# Patient Record
Sex: FEMALE | Race: BLACK OR AFRICAN AMERICAN | URBAN - METROPOLITAN AREA
[De-identification: names, ages, dates, MRNs, and addresses within clinical notes are randomized per-mention and may not be internally consistent; named-entity substitution may affect disease eponyms.]

---

## 2024-05-17 ENCOUNTER — ROUTINE PRENATAL (OUTPATIENT)
Age: 24
End: 2024-05-17

## 2024-05-17 ENCOUNTER — TELEPHONE (OUTPATIENT)
Age: 24
End: 2024-05-17

## 2024-05-17 VITALS
HEIGHT: 64 IN | SYSTOLIC BLOOD PRESSURE: 106 MMHG | BODY MASS INDEX: 33.19 KG/M2 | DIASTOLIC BLOOD PRESSURE: 72 MMHG | WEIGHT: 194.4 LBS | HEART RATE: 112 BPM

## 2024-05-17 DIAGNOSIS — O24.419 GDM (GESTATIONAL DIABETES MELLITUS): ICD-10-CM

## 2024-05-17 DIAGNOSIS — E66.9 OBESITY (BMI 30.0-34.9): ICD-10-CM

## 2024-05-17 DIAGNOSIS — O24.419 GESTATIONAL DIABETES MELLITUS (GDM) IN THIRD TRIMESTER, GESTATIONAL DIABETES METHOD OF CONTROL UNSPECIFIED: Primary | ICD-10-CM

## 2024-05-17 DIAGNOSIS — K21.9 GASTROESOPHAGEAL REFLUX DISEASE WITHOUT ESOPHAGITIS: Primary | ICD-10-CM

## 2024-05-17 RX ORDER — LANCETS 30 GAUGE
EACH MISCELLANEOUS
Qty: 200 EACH | Refills: 3 | Status: SHIPPED | OUTPATIENT
Start: 2024-05-17

## 2024-05-17 RX ORDER — GLUCOSAMINE HCL/CHONDROITIN SU 500-400 MG
CAPSULE ORAL
Qty: 200 STRIP | Refills: 3 | Status: SHIPPED | OUTPATIENT
Start: 2024-05-17

## 2024-05-17 RX ORDER — FAMOTIDINE 10 MG
10 TABLET ORAL 2 TIMES DAILY
COMMUNITY

## 2024-05-17 RX ORDER — BLOOD-GLUCOSE METER
EACH MISCELLANEOUS
Qty: 1 KIT | Refills: 0 | Status: SHIPPED | OUTPATIENT
Start: 2024-05-17

## 2024-05-17 RX ORDER — PEN NEEDLE, DIABETIC 31 GX5/16"
NEEDLE, DISPOSABLE MISCELLANEOUS
Qty: 200 EACH | Refills: 3 | Status: SHIPPED | OUTPATIENT
Start: 2024-05-17

## 2024-05-17 RX ORDER — PANTOPRAZOLE SODIUM 40 MG/1
40 TABLET, DELAYED RELEASE ORAL
Qty: 30 TABLET | Refills: 6 | Status: SHIPPED | OUTPATIENT
Start: 2024-05-17

## 2024-05-17 NOTE — PROCEDURES
PATIENT: HEATH RUCKER   -  : 2000   -  DOS:2024   -  INTERPRETING PROVIDER:Benoit Ramirez,   Indication  ========    Gestational diabetes    Method  ======    Transabdominal ultrasound examination. View: Suboptimal view: limited by late gestational age    Dating  ======    LMP on: 10/6/2023  GA by LMP 32 w + 0 d  JOY by LMP: 2024  Previous Ultrasound on: 2023  Type of prior assessment: GA  GA at prior assessment date 8 w + 1 d  GA by previous U/S 31 w + 5 d  JOY by previous Ultrasound: 2024  Ultrasound examination on: 2024  GA by U/S based upon: AC, BPD, Femur, HC  GA by U/S 31 w + 4 d  JOY by U/S: 7/15/2024  Assigned: based on the LMP, selected on 2024  Assigned GA 32 w + 0 d  Assigned JOY: 2024    Fetal Growth Overview  =================    Exam date        GA              BPD (mm)          HC (mm)             AC (mm)              FL (mm)             HL (mm)             EFW (g)  2024        32w 0d        78.1     22%        283.9    4%        280.9     53%        61.5    34%        52.8     18%        1860    36%    Fetal Biometry  ============    Standard  BPD 78.1 mm 31w 2d 22% Hadlock  OFD 99.2 mm 32w 0d 52% Robe  .9 mm 31w 1d 4% Hadlock  Cerebellum tr 42.5 mm 33w 3d 73% Hill  .9 mm 32w 1d 53% Hadlock  Femur 61.5 mm 31w 6d 34% Hadlock  Humerus 52.8 mm 30w 5d 18% Robe  EFW 1,860 g 31w 4d 36% Hadlock  EFW (lb) 4 lb  EFW (oz) 2 oz  EFW by: Hadlock (BPD-HC-AC-FL)  Extended   2.6 mm  Nasal bone 11.2 mm  Head / Face / Neck  Nasal bone: present  Other Structures   bpm    General Evaluation  ==============    Cardiac activity present.  bpm. Fetal movements: visualized. Presentation: Cephalic  Placenta: Placental site: anterior, appropriate distance from the internal os  Umbilical cord: Cord vessels: 3 vessel cord. Insertion site: central  Amniotic fluid: Amount of AF: normal. MVP 4.2 cm. WIL 13.7 cm. Q1 4.2 cm, Q2 3.1 cm, Q3 3.5 cm, Q4 3.0

## 2024-05-17 NOTE — TELEPHONE ENCOUNTER
Patient was accidentally scheduled at [facility]- Called patient to confirm pref location. Confirmed two patient identifiers.    Patient requested to cx [facility] and reschedule to [facility]- We rescheduled all appointments to [facility] and confirmed dates and time   Rx sent

## 2024-05-21 ENCOUNTER — TELEPHONE (OUTPATIENT)
Age: 24
End: 2024-05-21

## 2024-05-21 NOTE — TELEPHONE ENCOUNTER
Telephone call to Rayne Santos, ID verified, re GDM f/u appts.    Rayne desires to cxl f/u Gdm appts and submit logs via [facility] in lieu of appts r/t expense. Discussed importance of F/U appointments for ultrasound and assessment of glycemic control.  Gradeable financial assistance application number [phone] provided.  Rayne reports she will keep her F/U appts with MFM and will  her glucose supplies today to start checking her blood sugars 4 times per day and will provide glucose numbers via [facility] for assessment/management. She cxl her diabetes ed appt 5/22/24 r/t co-pay. She states she feels comfortable managing her diet and checking her blood sugars. Rayne confirms fetal movement and kick count instructions reviewed. All questions answered.  Cara Juarez, FNP-BC

## 2024-05-21 NOTE — TELEPHONE ENCOUNTER
Pt called in and stated that she wanted to cancel the rest of her appointments. She stated she only wanted to send in her logs via my chart bc she lives an hour away and each appointment is costing her money out of pocket. I informed her that I would get with the NP and if she has questions she would give her a call.